# Patient Record
Sex: OTHER/UNKNOWN | URBAN - METROPOLITAN AREA
[De-identification: names, ages, dates, MRNs, and addresses within clinical notes are randomized per-mention and may not be internally consistent; named-entity substitution may affect disease eponyms.]

---

## 2020-05-29 ENCOUNTER — APPOINTMENT (OUTPATIENT)
Age: 75
Setting detail: DERMATOLOGY
End: 2020-06-01

## 2020-05-29 VITALS
SYSTOLIC BLOOD PRESSURE: 122 MMHG | HEART RATE: 89 BPM | HEIGHT: 68 IN | WEIGHT: 180 LBS | DIASTOLIC BLOOD PRESSURE: 40 MMHG

## 2020-05-29 PROBLEM — D03.39 MELANOMA IN SITU OF OTHER PARTS OF FACE: Status: ACTIVE | Noted: 2020-05-29

## 2020-05-29 PROBLEM — C44.329 SQUAMOUS CELL CARCINOMA OF SKIN OF OTHER PARTS OF FACE: Status: ACTIVE | Noted: 2020-05-29

## 2020-05-29 PROCEDURE — OTHER TREATMENT REGIMEN: OTHER

## 2020-05-29 PROCEDURE — 99202 OFFICE O/P NEW SF 15 MIN: CPT

## 2020-05-29 PROCEDURE — OTHER CONSULTATION FOR MOHS SURGERY: OTHER

## 2020-05-29 PROCEDURE — OTHER COUNSELING: OTHER

## 2020-05-29 PROCEDURE — OTHER MIPS QUALITY: OTHER

## 2020-05-29 PROCEDURE — OTHER PRESCRIPTION: OTHER

## 2020-05-29 RX ORDER — MUPIROCIN 20 MG/G
2% OINTMENT TOPICAL BID
Qty: 1 | Refills: 1 | Status: ERX | COMMUNITY
Start: 2020-05-29

## 2020-05-29 NOTE — PROCEDURE: CONSULTATION FOR MOHS SURGERY
Name Of The Referring Provider For Procedure: [facility] Name Of The Referring Provider For Procedure: [facility]

## 2020-05-29 NOTE — PROCEDURE: TREATMENT REGIMEN
Initiate Treatment: mupirocin 2 % topical ointment Sig: Apply to wound twice daily starting in 48 hours after your procedure\\n\\ndoxycycline hyclate 100 mg tablet Sig: Take one tablet twice daily starting the day before surgery, the day of surgery, and the day after the surgery.
Detail Level: Zone

## 2020-05-29 NOTE — PROCEDURE: MIPS QUALITY
Name And Contact Information For Health Care Proxy: Lexie [phone] Name And Contact Information For Health Care Proxy: Lexie [phone]

## 2020-06-03 ENCOUNTER — APPOINTMENT (OUTPATIENT)
Age: 75
Setting detail: DERMATOLOGY
End: 2020-06-03

## 2020-06-03 VITALS
HEART RATE: 85 BPM | HEIGHT: 68 IN | SYSTOLIC BLOOD PRESSURE: 116 MMHG | DIASTOLIC BLOOD PRESSURE: 60 MMHG | WEIGHT: 160 LBS

## 2020-06-03 PROBLEM — D03.39 MELANOMA IN SITU OF OTHER PARTS OF FACE: Status: ACTIVE | Noted: 2020-06-03

## 2020-06-03 PROCEDURE — 14041 TIS TRNFR F/C/C/M/N/A/G/H/F: CPT

## 2020-06-03 PROCEDURE — OTHER MOHS SURGERY: OTHER

## 2020-06-03 PROCEDURE — OTHER MIPS QUALITY: OTHER

## 2020-06-03 PROCEDURE — 17311 MOHS 1 STAGE H/N/HF/G: CPT

## 2020-06-03 NOTE — PROCEDURE: MIPS QUALITY
Name And Contact Information For Health Care Proxy: Lexie [phone] Name And Contact Information For Health Care Proxy: Lexie [phone]

## 2020-06-03 NOTE — PROCEDURE: MOHS SURGERY
[address] [place] [address] DISCHARGE INSTRUCTIONS FOR:  ZOMETA (Zoledronic Acid): 1. Be sure to inform your Dentist that you are taking this drug prior to invasive dental 
 procedures. You should avoid major dental work while you are being treated with this     medicine. Report any signs/symptoms of jaw pain to your physician immediately. 2.  This medicine needs to be taken on a fixed schedule. If you miss a dose, make sure your doctor is informed. You will also need to have frequent lab work done; try to keep all of your appointments. Your doctor may also prescribe Vitamin D and calcium supplements. 3.  Make sure your doctor knows if you are taking fluid pills, arthritis medicines or antibiotics,  or if you have a history of problems with your gums, mouth or teeth. 4. Drink some extra fluids during the 12-hour period before and after you receive Zometa. Report any changes in urinary patterns (example: a decrease in how often you urinate). Also report rapid weight gain, swelling of the hands, ankles or feet, unusual tiredness or weakness or unusual bleeding or bruising. 5.  You may resume your normal activities after your infusion. Some people may have mild side effects from Zometa. These side effects usually improve after the first infusion. They may include: 
Mild nausea, diarrhea, constipation or upset stomach; Red or irritated eyes;  redness or itching at IV site; 
Mild skin rash, mild numbness, tingling, or burning in extremities; Headache, dizziness, mild muscle or joint pain, trouble sleeping; 
Nasal congestion, runny nose, sneezing 6. Signs/Symptoms of an allergic reaction may require immediate medical attention. These are rare, but may include one or more of the following:  
 
Skin - Swelling, blistering, weeping, crusting, rash, itching or; Wheezing, cough, sore throat, chest tightness, chest pain or shortness of breath, irregular heart beat; 
 Swelling of the face, eyelids, lips, tongue, or throat; dry mouth; Severe red (bloodshot), itchy, swollen, watery or painful eyes; 
Stomach pain, loss of appetite, nausea, vomiting, or bloody diarrhea; 
Severe headache, sleepiness or changes in personality; 
Numbness, tingling or pain around the mouth, teeth, or jaw. Contact your physician if you have questions or concerns, or experience any of the above symptoms. Rosette Adair, Signature: _______________________________ 2/11/2020 Mikie Rosas RN 
 
 
  
 
 Pain Refusal Text: I offered to prescribe pain medication but the patient refused to take this medication.

## 2020-06-04 ENCOUNTER — APPOINTMENT (OUTPATIENT)
Age: 75
Setting detail: DERMATOLOGY
End: 2020-06-04

## 2020-06-04 VITALS
HEART RATE: 62 BPM | SYSTOLIC BLOOD PRESSURE: 105 MMHG | HEIGHT: 68 IN | WEIGHT: 160 LBS | DIASTOLIC BLOOD PRESSURE: 61 MMHG

## 2020-06-04 PROBLEM — C44.42 SQUAMOUS CELL CARCINOMA OF SKIN OF SCALP AND NECK: Status: ACTIVE | Noted: 2020-06-04

## 2020-06-04 PROCEDURE — 17311 MOHS 1 STAGE H/N/HF/G: CPT | Mod: 79

## 2020-06-04 PROCEDURE — OTHER DISCHARGE ORDERS: OTHER

## 2020-06-04 PROCEDURE — OTHER MOHS SURGERY: OTHER

## 2020-06-04 PROCEDURE — 14021 TIS TRNFR S/A/L 10.1-30 SQCM: CPT | Mod: 79

## 2020-06-04 PROCEDURE — OTHER MIPS QUALITY: OTHER

## 2020-06-04 NOTE — PROCEDURE: MIPS QUALITY
Name And Contact Information For Health Care Proxy: Lexie [phone] Name And Contact Information For Health Care Proxy: Lexie [phone]

## 2022-07-14 NOTE — PROCEDURE: MOHS SURGERY
Implemented All Universal Safety Interventions:  [place] to call system. Call bell, personal items and telephone within reach. Instruct patient to call for assistance. Room bathroom lighting operational. Non-slip footwear when patient is off stretcher. Physically safe environment: no spills, clutter or unnecessary equipment. Stretcher in lowest position, wheels locked, appropriate side rails in place. Inflammation Suggestive Of Cancer Camouflage Histology Text: There was a dense lymphocytic infiltrate which prevented adequate histologic evaluation of adjacent structures.
